# Patient Record
(demographics unavailable — no encounter records)

---

## 2024-11-13 NOTE — PHYSICAL EXAM
[Normal Appearance] : normal appearance [Edema] : no peripheral edema [] : no respiratory distress [Normal Station and Gait] : the gait and station were normal for the patient's age [Skin Color & Pigmentation] : normal skin color and pigmentation [No Focal Deficits] : no focal deficits [Oriented To Time, Place, And Person] : oriented to person, place, and time [Affect] : the affect was normal [Mood] : the mood was normal [Not Anxious] : not anxious [de-identified] :  Deferred [de-identified] :  Deferred [de-identified] :  Deferred [FreeTextEntry3] : N/A -  exam not conducted

## 2024-11-13 NOTE — HISTORY OF PRESENT ILLNESS
[FreeTextEntry1] : MALIKA WELCH, a 64-year-old male, presented to the office for a follow up regarding his low testosterone and erectile dysfunction.  Patient advised he wakes up with morning erections occasionally. He has trouble on occasion keeping his erections during intercourse.  States he feels so much better with testosterone and has noticed his fatigue issues have resolved.

## 2024-11-13 NOTE — ASSESSMENT
[FreeTextEntry1] : Low T -renewed script -checked CBC, FRET  PSA -checked PSA level   ED -start Cialis 5mg PO Daily   Will follow up with Dr. Hoenig in May 2025 for prostate and reminded patient to have next set of labs for TRT taken then (CBC and FRET)

## 2025-02-21 NOTE — PHYSICAL EXAM
[UE] : Sensory: Intact in bilateral upper extremities [Rad] : radial 2+ and symmetric bilaterally [Normal] : Alert and in no acute distress [Poor Appearance] : well-appearing [Acute Distress] : not in acute distress [Obese] : not obese [de-identified] : The patient has no respiratory distress. Mood and affect are normal. The patient is alert and oriented to person, place and time. There is no pain with active or passive motion of the shoulders.  There is no shoulder tenderness.  He has prominence of the right olecranon bursa and lesser prominence of the left olecranon bursa.  There is no pain with elbow motion.  There is no pain with forearm rotation.  There is no elbow or wrist instability.  The skin is intact.  There is no lymphedema. [de-identified] : AP, lateral and oblique x-rays of both elbows taken today demonstrate no fracture or dislocation.  There is soft tissue swelling at both olecranon bursae.

## 2025-02-21 NOTE — HISTORY OF PRESENT ILLNESS
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 Patient reports symptoms began a few months ago atraumatically. Patient reports no pain to bilateral elbows or decreased range of motion. Patient reports having left elbow drained in December by another orthopedist with temporarily relief, but swelling slowly returned. Patient denies any OTC use. Patient denies fever, chills, dec ROM, numbness, tingling, bruising, previous elbow injuries. Patient inquiring about aspiration of bilateral elbows.

## 2025-02-21 NOTE — DISCUSSION/SUMMARY
[de-identified] : The patient has olecranon bursitis of both elbows.  The right elbow is larger than the left.  I have discussed the pathology, natural history and treatment options with him.  He requests aspiration of both olecranon bursae.  I have explained that the most important thing is stopping direct pressure on his elbows.  Repeat aspirations have the risk of an infection in his elbow. With informed consent and sterile prep the right elbow was aspirated with return of 15 cc of bloody fluid.  Sterile dressing and compression were applied.  The left elbow was aspirated with return of 5 cc of bloody fluid.  Sterile dressing was applied.  The procedures were well-tolerated.  Patient will return as needed.

## 2025-05-01 NOTE — HISTORY OF PRESENT ILLNESS
[FreeTextEntry1] : Now 65 yr old male presents to follow up with BPH and PSA.  Patient is here for routine physical. Pt complains of occasional urgency. Pt drinks one coffee and three teas . PSA in the past has been fluctuating. Patient was previously seeing Dr. Anderson. Pt has not started saw palmetto.   No gross hematuria, no dysuria. Feels he's able to empty. No flank or abdominal pain  Pt complaining of low energy prior to beginning testosterone therapy- was falling asleep all the time, including even while driving! Now resolved, using testosterone. Also using sildenafil for erections, with dramatic and sustained improment  PSA 3.44- 11/2024 3.6- 01/2024 3.45- 05/2023 3.09- 07/2022     [Nocturia] : nocturia [Straining] : no straining [Weak Stream] : no weak stream [Dysuria] : no dysuria [Hematuria - Gross] : no gross hematuria [None] : None

## 2025-05-01 NOTE — PHYSICAL EXAM
[General Appearance - Well Developed] : well developed [General Appearance - Well Nourished] : well nourished [Normal Appearance] : normal appearance [Well Groomed] : well groomed [General Appearance - In No Acute Distress] : no acute distress [Edema] : no peripheral edema [Respiration, Rhythm And Depth] : normal respiratory rhythm and effort [Exaggerated Use Of Accessory Muscles For Inspiration] : no accessory muscle use [FreeTextEntry1] : prostate exam deferred after discussion [Normal Station and Gait] : the gait and station were normal for the patient's age [] : no rash [No Focal Deficits] : no focal deficits [Oriented To Time, Place, And Person] : oriented to person, place, and time [Affect] : the affect was normal [Mood] : the mood was normal [Not Anxious] : not anxious [No Palpable Adenopathy] : no palpable adenopathy

## 2025-05-01 NOTE — ASSESSMENT
[FreeTextEntry1] : Overall exam okay voiding sx stable. PSA reviewed- no sig change, stable pt with some fatigue, decreased libido- questions whether due to sleep quality or potential issues with testosterone  we discussed w/u, meds, supplements such as saw palmetto  plan u/a culture today rtc 6 months with repeat psa, and will again move to yearly cont sildenafil, testosterone